# Patient Record
(demographics unavailable — no encounter records)

---

## 2024-12-13 NOTE — HISTORY OF PRESENT ILLNESS
[FreeTextEntry1] : Follow up for A1c check  [de-identified] : 52M w/ hx of HTN, pre-DM, and HLD presents for f/u visit for a1c check. Offered  services which patient declined. No new complaints today. States that occasionally forgets to take his medication but did take all of them today. In terms of lifestyle changes, states that he has been watching portion sizes of his meals and reducing the amount of times he eats per day (previously was eating more than 3 meals per day). No alcohol or sugar-sweetened beverages but will put a spoonful of sugar in his coffee.  In terms of physical activity, states that he works as a  which is his primary form of exercise, as it involves constant walking and heavy lifting. On vital check, patient has lost 11 lbs since March 2024. Has not been taking his FS or blood pressure at home. Otherwise denies recent sickness, fevers, cough. chest pain, SOB, leg swelling, or other concerning symptoms.

## 2024-12-13 NOTE — PLAN
[FreeTextEntry1] : 52M w/ hx of HTN, pre-DM (A1c 6.2 06/24), and HLD presents for follow up visit for a1c check   #Prediabetes  - a1c today 5.8; a1c 6.2% in June 2024 - c/w metformin 500mg qD  - Has not used glucometer yet. Encouraged patient to start to take blood sugar readings which would help monitor diabetes. Patient expressed understanding.   #Essential hypertension - Manual /70 - Enrolled in [facility] - c/w lisinopril 40mg qD - c/w amlodipine 10mg - c/w HCTZ 12.5mg qd - Counseled on the importance of getting an accurate blood pressure measurement which is best taken at home. Patient expressed understanding   #HLD Lipid profile wnl in June 2024 - Atorvastatin 20 mg x2 per [facility] formula  # Health care maintenance - Pt educated to c/w diet and exercise for better BP, glucose and cholesterol control, - script for FIT testing and colonoscopy given - patient stated that he had the information at home but hasn't been able to call GI yet. Encouraged patient to do so soon, counseled on the importance of colon cancer screening.  -Tdap 7/2015 -> due in 2025 -Shingrix offered today, patient deferred to next visit due to needing to go to work    RTC in 6 months Case d/w Dr. Butt.   Katheryn Johnson, PGY-1

## 2024-12-13 NOTE — HEALTH RISK ASSESSMENT
[0] : 2) Feeling down, depressed, or hopeless: Not at all (0) [Never (0 pts)] : Never (0 points) [PHQ-2 Negative - No further assessment needed] : PHQ-2 Negative - No further assessment needed [Former] : Former [0-4] : 0-4 [> 15 Years] : > 15 Years [No] : In the past 12 months have you used drugs other than those required for medical reasons? No [[id number]] : 0